# Patient Record
Sex: FEMALE | Race: WHITE | URBAN - METROPOLITAN AREA
[De-identification: names, ages, dates, MRNs, and addresses within clinical notes are randomized per-mention and may not be internally consistent; named-entity substitution may affect disease eponyms.]

---

## 2017-01-24 ENCOUNTER — OFFICE VISIT (OUTPATIENT)
Dept: PEDIATRICS | Facility: CLINIC | Age: 37
End: 2017-01-24
Payer: COMMERCIAL

## 2017-01-24 VITALS
WEIGHT: 172.9 LBS | DIASTOLIC BLOOD PRESSURE: 66 MMHG | OXYGEN SATURATION: 97 % | TEMPERATURE: 98.6 F | HEART RATE: 89 BPM | SYSTOLIC BLOOD PRESSURE: 110 MMHG | BODY MASS INDEX: 28.81 KG/M2 | HEIGHT: 65 IN

## 2017-01-24 DIAGNOSIS — J02.0 ACUTE STREPTOCOCCAL PHARYNGITIS: Primary | ICD-10-CM

## 2017-01-24 LAB
DEPRECATED S PYO AG THROAT QL EIA: ABNORMAL
MICRO REPORT STATUS: ABNORMAL
SPECIMEN SOURCE: ABNORMAL

## 2017-01-24 PROCEDURE — 87880 STREP A ASSAY W/OPTIC: CPT | Performed by: PHYSICIAN ASSISTANT

## 2017-01-24 PROCEDURE — 99213 OFFICE O/P EST LOW 20 MIN: CPT | Performed by: PHYSICIAN ASSISTANT

## 2017-01-24 RX ORDER — AZITHROMYCIN 250 MG/1
TABLET, FILM COATED ORAL
Qty: 6 TABLET | Refills: 0 | Status: SHIPPED | OUTPATIENT
Start: 2017-01-24 | End: 2017-05-23

## 2017-01-24 RX ORDER — PRENATAL VIT/IRON FUM/FOLIC AC 27MG-0.8MG
1 TABLET ORAL DAILY
Qty: 100 TABLET | Refills: 3 | COMMUNITY
Start: 2017-01-24

## 2017-01-24 NOTE — MR AVS SNAPSHOT
After Visit Summary   1/24/2017    Tamiko Whitman    MRN: [id number]           Patient Information     Date Of Birth          1980        Visit Information        Provider Department      1/24/2017 1:30 PM Alessandra Dorantes PA-C [facility]        Today's Diagnoses     Acute streptococcal pharyngitis    -  1       Care Instructions    Rapid strep test is positive.     Continue with rest and fluids. May take analgesics for symptomatic relief.    Do not share drinks/food with others. Discard toothbrush in one week.     Return if your symptoms persist or worsen.           Follow-ups after your visit        Who to contact     If you have questions or need follow up information about today's clinic visit or your schedule please contact [facility] directly at [phone].  Normal or non-critical lab and imaging results will be communicated to you by MyChart, letter or phone within 4 business days after the clinic has received the results. If you do not hear from us within 7 days, please contact the clinic through MyChart or phone. If you have a critical or abnormal lab result, we will notify you by phone as soon as possible.  Submit refill requests through [employer] or call your pharmacy and they will forward the refill request to us. Please allow 3 business days for your refill to be completed.          Additional Information About Your Visit        MyChart Information     [employer] gives you secure access to your electronic health record. If you see a primary care provider, you can also send messages to your care team and make appointments. If you have questions, please call your primary care clinic.  If you do not have a primary care provider, please call [phone] and they will assist you.        Care EveryWhere ID     This is your Care EveryWhere ID. This could be used by other organizations to access your [place] medical records  [id number]        Your Vitals  "Were     Pulse Temperature Height BMI (Body Mass Index) Pulse Oximetry       89 98.6  F (37  C) (Oral) 5' 5\" (1.651 m) 28.77 kg/m2 97%        Blood Pressure from Last 3 Encounters:   01/24/17 110/66   03/29/16 96/53   12/30/13 115/58    Weight from Last 3 Encounters:   01/24/17 172 lb 14.4 oz (78.427 kg)   12/30/13 162 lb 3.2 oz (73.573 kg)              We Performed the Following     Strep, Rapid Screen          Today's Medication Changes          These changes are accurate as of: 1/24/17  2:04 PM.  If you have any questions, ask your nurse or doctor.               Start taking these medicines.        Dose/Directions    azithromycin 250 MG tablet   Commonly known as:  ZITHROMAX   Used for:  Acute streptococcal pharyngitis   Started by:  Alessandra Dorantes PA-C        Two tablets first day, then one tablet daily for four days.   Quantity:  6 tablet   Refills:  0            Where to get your medicines      These medications were sent to [facility] [address] [place] [id number]    Hours:  24-hours Phone:  [phone]    - azithromycin 250 MG tablet             Primary Care Provider Office Phone # Fax #    Mell Burrows PA-C [phone]       [facility] [address] [facility] [address]  [facility] [address]        Thank you!     Thank you for choosing [facility]  for your care. Our goal is always to provide you with excellent care. Hearing back from our patients is one way we can continue to improve our services. Please take a few minutes to complete the written survey that you may receive in the mail after your visit with us. Thank you!             Your Updated Medication List - Protect others around you: Learn how to safely use, store and throw away your medicines at www.disposemymeds.org.          This list is accurate as of: 1/24/17  2:04 PM.  Always use your most recent med " list.                   Brand Name Dispense Instructions for use    azithromycin 250 MG tablet    ZITHROMAX    6 tablet    Two tablets first day, then one tablet daily for four days.       IRON (FERROUS GLUCONATE) 325 MG Tabs      1 TABLET DAILY       prenatal multivitamin  plus iron 27-0.8 MG Tabs per tablet     100 tablet    Take 1 tablet by mouth daily

## 2017-01-24 NOTE — NURSING NOTE
"Chief Complaint   Patient presents with     Pharyngitis       Initial /66 mmHg  Pulse 89  Temp(Src) 98.6  F (37  C) (Oral)  Ht 5' 5\" (1.651 m)  Wt 172 lb 14.4 oz (78.427 kg)  BMI 28.77 kg/m2  SpO2 97% Estimated body mass index is 28.77 kg/(m^2) as calculated from the following:    Height as of this encounter: 5' 5\" (1.651 m).    Weight as of this encounter: 172 lb 14.4 oz (78.427 kg).  BP completed using cuff size: regular    "

## 2017-01-24 NOTE — PROGRESS NOTES
"  SUBJECTIVE:                                                    Tamiko Whitman is a 36 year old female who presents to clinic today for the following health issues:    Acute Illness   Acute illness concerns: sore throat  Onset: yesterday morning     Fever: no    Chills/Sweats: YES    Headache (location?): YES    Sinus Pressure:no    Conjunctivitis:  no    Ear Pain: YES: right    Rhinorrhea: no    Congestion: no    Sore Throat: YES-only with swallowing     Cough: no    Wheeze: no    Decreased Appetite: YES    Nausea: YES    Vomiting: no    Diarrhea:  no    Dysuria/Freq.: no    Fatigue/Achiness: YES    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     Problem list, Medication list, Allergies, and Medical/Social/Surgical histories reviewed in [facility] and updated as appropriate.    ROS:  ROS otherwise negative    OBJECTIVE:                                                    /66 mmHg  Pulse 89  Temp(Src) 98.6  F (37  C) (Oral)  Ht 5' 5\" (1.651 m)  Wt 172 lb 14.4 oz (78.427 kg)  BMI 28.77 kg/m2  SpO2 97%  Body mass index is 28.77 kg/(m^2).   GENERAL: alert, no distress  HENT: ear canals- normal; TMs- normal; Nose- normal; Mouth- erythemic posterior pharynx without exudate; no sinus tenderness   NECK: tonsillar LAD  RESP: lungs clear to auscultation - no rales, no rhonchi, no wheezes  CV: regular rates and rhythm, normal S1 S2, no S3 or S4 and no murmur, no click or rub    Diagnostic test results:  Results for orders placed or performed in visit on 01/24/17 (from the past 24 hour(s))   Strep, Rapid Screen   Result Value Ref Range    Specimen Description Throat     Rapid Strep A Screen (A)      POSITIVE: Group A Streptococcal antigen detected by immunoassay.    Micro Report Status FINAL 01/24/2017         ASSESSMENT/PLAN:                                                    (J02.0) Acute streptococcal pharyngitis  (primary encounter " diagnosis)  Comment: begin antibiotics. Limit exposures.   Plan: azithromycin (ZITHROMAX) 250 MG tablet          See Patient Instructions    Alessandra Dorantes PA-C  [facility]

## 2017-05-23 ENCOUNTER — OFFICE VISIT (OUTPATIENT)
Dept: FAMILY MEDICINE | Facility: CLINIC | Age: 37
End: 2017-05-23
Payer: COMMERCIAL

## 2017-05-23 VITALS
WEIGHT: 172.2 LBS | BODY MASS INDEX: 28.69 KG/M2 | HEART RATE: 64 BPM | TEMPERATURE: 98.6 F | SYSTOLIC BLOOD PRESSURE: 114 MMHG | OXYGEN SATURATION: 100 % | HEIGHT: 65 IN | DIASTOLIC BLOOD PRESSURE: 70 MMHG

## 2017-05-23 DIAGNOSIS — D50.9 IRON DEFICIENCY ANEMIA, UNSPECIFIED IRON DEFICIENCY ANEMIA TYPE: ICD-10-CM

## 2017-05-23 DIAGNOSIS — R21 RASH AND NONSPECIFIC SKIN ERUPTION: Primary | ICD-10-CM

## 2017-05-23 DIAGNOSIS — E53.8 VITAMIN B 12 DEFICIENCY: ICD-10-CM

## 2017-05-23 DIAGNOSIS — Z78.9 BREASTFEEDING (INFANT): ICD-10-CM

## 2017-05-23 DIAGNOSIS — O92.29 SORE NIPPLES DUE TO LACTATION: ICD-10-CM

## 2017-05-23 LAB — VIT B12 SERPL-MCNC: 503 PG/ML (ref 193–986)

## 2017-05-23 PROCEDURE — 82607 VITAMIN B-12: CPT | Performed by: PHYSICIAN ASSISTANT

## 2017-05-23 PROCEDURE — 99213 OFFICE O/P EST LOW 20 MIN: CPT | Performed by: PHYSICIAN ASSISTANT

## 2017-05-23 PROCEDURE — 36415 COLL VENOUS BLD VENIPUNCTURE: CPT | Performed by: PHYSICIAN ASSISTANT

## 2017-05-23 PROCEDURE — 82728 ASSAY OF FERRITIN: CPT | Performed by: PHYSICIAN ASSISTANT

## 2017-05-23 RX ORDER — LANOLIN ALCOHOL/MO/W.PET/CERES
1000 CREAM (GRAM) TOPICAL EVERY OTHER DAY
COMMUNITY

## 2017-05-23 NOTE — PROGRESS NOTES
"HPI: 38 yo female pt here for rash  This is a slightly pruritic rash characterized by tiny red bumps on hands and arms  Her dtr had a bad diaper rash so needed antifungal cream and shortly after that she noticed the red spots on her hands and arms and chest.  Seems worse since yesterday. Started Friday  Some of these lesions itch and some don't  She cut the grass on Friday but not typically allergic to anything  She had a cold 2-3 weeks ago and has a lingering cough (dry)  It is typical for her to have this.  She is breast feeding and nipples raw and sore and bleeding since last week.  She has tried the antifungal cream which is helping some.  Followed by gyn for paps and had a baby girl 14 months ago  [facility]  Denies any new supplements or medications.  Denies new foods, creams, soaps, lotions or detergents  She works over at [employer] and no new chemical exposures.    Past Medical History:   Diagnosis Date     Syncope 2011    iron def     Past Surgical History:   Procedure Laterality Date     APPENDECTOMY  age 13     Social History   Substance Use Topics     Smoking status: Never Smoker     Smokeless tobacco: Never Used     Alcohol use No     Current Outpatient Prescriptions   Medication Sig Dispense Refill     cyanocobalamin (VITAMIN  B-12) 1000 MCG tablet Take 1,000 mcg by mouth every other day       Prenatal Vit-Fe Fumarate-FA (PRENATAL MULTIVITAMIN  PLUS IRON) 27-0.8 MG TABS per tablet Take 1 tablet by mouth daily 100 tablet 3     IRON (FERROUS GLUCONATE) 325 MG OR TABS 1 TABLET DAILY       Allergies   Allergen Reactions     Penicillins      FAMILY HISTORY NOTED AND REVIEWED    PHYSICAL EXAM:    /70 (BP Location: Right arm, Patient Position: Chair, Cuff Size: Adult Regular)  Pulse 64  Temp 98.6  F (37  C) (Oral)  Ht 5' 5\" (1.651 m)  Wt 172 lb 3.2 oz (78.1 kg)  LMP 04/17/2017 (Approximate)  SpO2 100%  BMI 28.66 kg/m2    Patient appears non toxic  Skin; there are tiny papules noted on hands and " arms and chest only  None on face  No vesicular lesions  No scale or crust  No streaking erythema.  Nipples: no rash or crust    Assessment and Plan:     (R21) Rash and nonspecific skin eruption  (primary encounter diagnosis)  Comment: ? Viral examthem from recent URI  Plan: observe for now as not particularly bothersome but she works at [employer] and wanted to make sure she didn't have some rash that was contagious    (Z78.9) Breastfeeding (infant)  Comment:   Plan: has 14 mo old dtr and she continues to breast feed regularly    (D50.9) Iron deficiency anemia, unspecified iron deficiency anemia type  Comment: hx of low iron and pt would like her ferritin rechecked.  She hasn't had normal periods since dtr born  Plan: Ferritin            (E53.8) Vitamin B 12 deficiency  Comment: does eat meat so wants level checked. Does take a supplement QOD  Plan: Vitamin B12            (O92.29) Sore nipples due to lactation  Comment:   Plan: try OTC nipple barrier cream      Mell Burrows PA-C

## 2017-05-23 NOTE — MR AVS SNAPSHOT
After Visit Summary   5/23/2017    Tamiko Whitman    MRN: [id number]           Patient Information     Date Of Birth          1980        Visit Information        Provider Department      5/23/2017 12:00 PM Mell Burrows PA-C [facility]        Today's Diagnoses     Rash and nonspecific skin eruption    -  1    Breastfeeding (infant)        Iron deficiency anemia, unspecified iron deficiency anemia type        Vitamin B 12 deficiency        Sore nipples due to lactation           Follow-ups after your visit        Who to contact     If you have questions or need follow up information about today's clinic visit or your schedule please contact [facility] directly at [phone].  Normal or non-critical lab and imaging results will be communicated to you by [employer], letter or phone within 4 business days after the clinic has received the results. If you do not hear from us within 7 days, please contact the clinic through [employer] or phone. If you have a critical or abnormal lab result, we will notify you by phone as soon as possible.  Submit refill requests through [employer] or call your pharmacy and they will forward the refill request to us. Please allow 3 business days for your refill to be completed.          Additional Information About Your Visit        MyChart Information     [employer] gives you secure access to your electronic health record. If you see a primary care provider, you can also send messages to your care team and make appointments. If you have questions, please call your primary care clinic.  If you do not have a primary care provider, please call [phone] and they will assist you.        Care EveryWhere ID     This is your Care EveryWhere ID. This could be used by other organizations to access your [place] medical records  [id number]        Your Vitals Were     Pulse Temperature Height Last Period Pulse Oximetry BMI (Body Mass Index)    64 98.6  F  "(37  C) (Oral) 5' 5\" (1.651 m) 04/17/2017 (Approximate) 100% 28.66 kg/m2       Blood Pressure from Last 3 Encounters:   05/23/17 114/70   01/24/17 110/66   03/29/16 96/53    Weight from Last 3 Encounters:   05/23/17 172 lb 3.2 oz (78.1 kg)   01/24/17 172 lb 14.4 oz (78.4 kg)   12/30/13 162 lb 3.2 oz (73.6 kg)              We Performed the Following     Ferritin     Vitamin B12        Primary Care Provider Office Phone # Fax #    Mell Burrows PA-C [phone]       [facility] [address] [address]        Thank you!     Thank you for choosing [facility]  for your care. Our goal is always to provide you with excellent care. Hearing back from our patients is one way we can continue to improve our services. Please take a few minutes to complete the written survey that you may receive in the mail after your visit with us. Thank you!             Your Updated Medication List - Protect others around you: Learn how to safely use, store and throw away your medicines at www.disposemymeds.org.          This list is accurate as of: 5/23/17  2:13 PM.  Always use your most recent med list.                   Brand Name Dispense Instructions for use    cyanocobalamin 1000 MCG tablet    vitamin  B-12     Take 1,000 mcg by mouth every other day       IRON (FERROUS GLUCONATE) 325 MG Tabs      1 TABLET DAILY       prenatal multivitamin  plus iron 27-0.8 MG Tabs per tablet     100 tablet    Take 1 tablet by mouth daily         "

## 2017-05-23 NOTE — Clinical Note
Please abstract the following data from this visit with this patient into the appropriate field in Epic:  Pap smear done on this date: 11/25/2014 (approximately), by this group: [facility], results were Normal.

## 2017-05-23 NOTE — NURSING NOTE
"Chief Complaint   Patient presents with     Establish Care       Initial /70 (BP Location: Right arm, Patient Position: Chair, Cuff Size: Adult Regular)  Pulse 64  Temp 98.6  F (37  C) (Oral)  Ht 5' 5\" (1.651 m)  Wt 172 lb 3.2 oz (78.1 kg)  LMP 04/17/2017 (Approximate)  SpO2 100%  BMI 28.66 kg/m2 Estimated body mass index is 28.66 kg/(m^2) as calculated from the following:    Height as of this encounter: 5' 5\" (1.651 m).    Weight as of this encounter: 172 lb 3.2 oz (78.1 kg).  Medication Reconciliation: complete   Naz Monte MA  "

## 2017-05-24 LAB — FERRITIN SERPL-MCNC: 44 NG/ML (ref 12–150)

## 2017-07-31 ENCOUNTER — OFFICE VISIT (OUTPATIENT)
Dept: URGENT CARE | Facility: URGENT CARE | Age: 37
End: 2017-07-31
Payer: COMMERCIAL

## 2017-07-31 VITALS
WEIGHT: 159 LBS | HEART RATE: 83 BPM | SYSTOLIC BLOOD PRESSURE: 131 MMHG | BODY MASS INDEX: 26.46 KG/M2 | DIASTOLIC BLOOD PRESSURE: 85 MMHG | TEMPERATURE: 96.8 F | OXYGEN SATURATION: 99 %

## 2017-07-31 DIAGNOSIS — S61.309A AVULSION OF FINGERNAIL OF LEFT HAND: Primary | ICD-10-CM

## 2017-07-31 DIAGNOSIS — S61.319A: ICD-10-CM

## 2017-07-31 PROCEDURE — 99213 OFFICE O/P EST LOW 20 MIN: CPT | Performed by: INTERNAL MEDICINE

## 2017-07-31 NOTE — PROGRESS NOTES
SUBJECTIVE:  Tamiko Whitman, a 37 year old female scheduled an appointment to discuss the following issues:  Chief Complaint   Patient presents with     Laceration     Pt in clinic to have eval for left hand index finger laceration.       Patient was cutting vegetables for dinner and cut off the tip of her left finger and fingernail.  She came directly from home.    Tdap 2016        Current Outpatient Prescriptions on File Prior to Visit:  cyanocobalamin (VITAMIN  B-12) 1000 MCG tablet Take 1,000 mcg by mouth every other day   Prenatal Vit-Fe Fumarate-FA (PRENATAL MULTIVITAMIN  PLUS IRON) 27-0.8 MG TABS per tablet Take 1 tablet by mouth daily   IRON (FERROUS GLUCONATE) 325 MG OR TABS 1 TABLET DAILY     No current facility-administered medications on file prior to visit.       Medical, social, surgical, and family histories reviewed and updated as of 7/31/2017.    OBJECTIVE:  /85  Pulse 83  Temp 96.8  F (36  C) (Tympanic)  Wt 159 lb (72.1 kg)  SpO2 99%  BMI 26.46 kg/m2  EXAM:  GENERAL APPEARANCE: healthy, alert and  distress due to pain  MS: Left hand/index finger  Distal lateral tip of nail-skin was cut off  No skin flap or area to suture  Actively bleeding during exam.  Pressure applied to control bleeding with 3 separate layers of Gelfoam needing to be applied with pressure to stop bleeding  Area bandaged and   Pressure Tubegauz applied by nurse      ASSESSMENT/PLAN:    ASSESSMENT/PLAN:      ICD-10-CM    1. Avulsion of fingernail of left hand S61.309A    2. Laceration of fingernail bed, initial encounter S61.319A        Instructed patient to leave to gauze in place until fell off on its own at which point she can apply an antibiotic ointment to it twice a day.    Recommend elevating finger and no heavy activity to avoid rebleeding over next few days    Call or return to clinic if symptoms worsen or fail to improve as anticipated.         Candelaria Nuñez MD

## 2017-07-31 NOTE — NURSING NOTE
"Chief Complaint   Patient presents with     Laceration     Pt in clinic to have eval for left hand index finger laceration.       Initial /85  Pulse 83  Temp 96.8  F (36  C) (Tympanic)  Wt 159 lb (72.1 kg)  SpO2 99%  BMI 26.46 kg/m2 Estimated body mass index is 26.46 kg/(m^2) as calculated from the following:    Height as of 5/23/17: 5' 5\" (1.651 m).    Weight as of this encounter: 159 lb (72.1 kg).  Medication Reconciliation: complete   Yvrose Greenwood/ MA    "

## 2017-07-31 NOTE — MR AVS SNAPSHOT
After Visit Summary   7/31/2017    Tamiko Whitman    MRN: [id number]           Patient Information     Date Of Birth          1980        Visit Information        Provider Department      7/31/2017 6:25 PM Candelaria Nuñez MD [facility]        Today's Diagnoses     Avulsion of fingernail of left hand    -  1    Laceration of fingernail bed, initial encounter           Follow-ups after your visit        Who to contact     If you have questions or need follow up information about today's clinic visit or your schedule please contact [facility] directly at [phone].  Normal or non-critical lab and imaging results will be communicated to you by MyChart, letter or phone within 4 business days after the clinic has received the results. If you do not hear from us within 7 days, please contact the clinic through [employer] or phone. If you have a critical or abnormal lab result, we will notify you by phone as soon as possible.  Submit refill requests through [employer] or call your pharmacy and they will forward the refill request to us. Please allow 3 business days for your refill to be completed.          Additional Information About Your Visit        MyChart Information     [employer] gives you secure access to your electronic health record. If you see a primary care provider, you can also send messages to your care team and make appointments. If you have questions, please call your primary care clinic.  If you do not have a primary care provider, please call [phone] and they will assist you.        Care EveryWhere ID     This is your Care EveryWhere ID. This could be used by other organizations to access your [place] medical records  [id number]        Your Vitals Were     Pulse Temperature Pulse Oximetry BMI (Body Mass Index)          83 96.8  F (36  C) (Tympanic) 99% 26.46 kg/m2         Blood Pressure from Last 3 Encounters:   07/31/17 131/85    05/23/17 114/70   01/24/17 110/66    Weight from Last 3 Encounters:   07/31/17 159 lb (72.1 kg)   05/23/17 172 lb 3.2 oz (78.1 kg)   01/24/17 172 lb 14.4 oz (78.4 kg)              Today, you had the following     No orders found for display       Primary Care Provider Office Phone # Fax #    Mell Burrows PA-C [phone]       [facility] [address]  [facility] [address]        Equal Access to Services     [facility] ELIEL : Hadii aad ku hadasho Soomaali, waaxda luqadaha, qaybta kaalmada adeegyada, fam wharton haydarrick mallory . So [facility] [phone].    ATENCIÓN: Si habla español, tiene a mon disposición servicios gratuitos de asistencia lingüística. Llame al [phone].    We comply with applicable federal civil rights laws and [place] laws. We do not discriminate on the basis of race, color, national origin, age, disability sex, sexual orientation or gender identity.            Thank you!     Thank you for choosing [facility]  for your care. Our goal is always to provide you with excellent care. Hearing back from our patients is one way we can continue to improve our services. Please take a few minutes to complete the written survey that you may receive in the mail after your visit with us. Thank you!             Your Updated Medication List - Protect others around you: Learn how to safely use, store and throw away your medicines at www.disposemymeds.org.          This list is accurate as of: 7/31/17  7:36 PM.  Always use your most recent med list.                   Brand Name Dispense Instructions for use Diagnosis    cyanocobalamin 1000 MCG tablet    vitamin  B-12     Take 1,000 mcg by mouth every other day        FOLIC ACID PO      Take 1 mg by mouth daily        IRON (FERROUS GLUCONATE) 325 MG Tabs      1 TABLET DAILY        prenatal multivitamin  plus iron 27-0.8 MG Tabs per tablet     100 tablet    Take 1 tablet by mouth daily

## 2017-08-08 ENCOUNTER — NURSE TRIAGE (OUTPATIENT)
Dept: NURSING | Facility: CLINIC | Age: 37
End: 2017-08-08

## 2017-08-08 NOTE — TELEPHONE ENCOUNTER
Patient requesting what to do with left index finger dressing that was placed on 7/31/17 at .  FNA referenced notes in EPIC and reviewed the instructions with patient.  Patient states she has not removed any of the dressing that was placed at .  FNA instructed to remove while speaking with patient and she stated she is at work and will do so when she gets home.  FNA advised that once dressing is removed to thoroughly and gently wash area and may apply antibiotic ointment.  Encouraged patient to call back if any questions/concerns once she removed dressing.

## 2017-11-03 LAB
ABO + RH BLD: NORMAL
ABO + RH BLD: NORMAL
BLD GP AB SCN SERPL QL: NORMAL
HBV SURFACE AG SERPL QL IA: NORMAL
HIV 1+2 AB+HIV1 P24 AG SERPL QL IA: NORMAL
RUBELLA ANTIBODY IGG QUANTITATIVE: NORMAL IU/ML
T PALLIDUM IGG SER QL: NON REACTIVE

## 2017-12-21 ENCOUNTER — OFFICE VISIT (OUTPATIENT)
Dept: FAMILY MEDICINE | Facility: CLINIC | Age: 37
End: 2017-12-21
Payer: COMMERCIAL

## 2017-12-21 VITALS
OXYGEN SATURATION: 98 % | RESPIRATION RATE: 16 BRPM | BODY MASS INDEX: 26.66 KG/M2 | DIASTOLIC BLOOD PRESSURE: 58 MMHG | SYSTOLIC BLOOD PRESSURE: 106 MMHG | TEMPERATURE: 98 F | WEIGHT: 160 LBS | HEART RATE: 70 BPM | HEIGHT: 65 IN

## 2017-12-21 DIAGNOSIS — J01.90 ACUTE SINUSITIS WITH SYMPTOMS > 10 DAYS: Primary | ICD-10-CM

## 2017-12-21 DIAGNOSIS — Z33.1 PREGNANCY, INCIDENTAL: ICD-10-CM

## 2017-12-21 PROCEDURE — 99213 OFFICE O/P EST LOW 20 MIN: CPT | Performed by: PHYSICIAN ASSISTANT

## 2017-12-21 RX ORDER — AZITHROMYCIN 250 MG/1
TABLET, FILM COATED ORAL
Qty: 6 TABLET | Refills: 0 | Status: SHIPPED | OUTPATIENT
Start: 2017-12-21 | End: 2018-02-22

## 2017-12-21 NOTE — NURSING NOTE
"Chief Complaint   Patient presents with     Sinus Problem       Initial /58  Pulse 70  Temp 98  F (36.7  C) (Tympanic)  Resp 16  Ht 5' 5\" (1.651 m)  Wt 160 lb (72.6 kg)  LMP 04/17/2017 (Approximate)  SpO2 98%  Breastfeeding? No  BMI 26.63 kg/m2 Estimated body mass index is 26.63 kg/(m^2) as calculated from the following:    Height as of this encounter: 5' 5\" (1.651 m).    Weight as of this encounter: 160 lb (72.6 kg).  Medication Reconciliation: complete  "

## 2017-12-21 NOTE — PROGRESS NOTES
SUBJECTIVE:   Tamiko Whitman is a 37 year old female who presents to clinic today for the following health issues:    Pt here with feeling sick since Oct  She has persistent nasal drainage off and on for the past 2 months  Has post nasal drip and worse in the morning  She had laryngitis initially but that improved  She had pink eye in Nov and did see eye doc and used drops which helped resolve that  Has had HA for a few days  Denies any fever or chills  Does not have a hx of sinus infections  Has a 20 month old dtr who is constantly sick and she is currently on antibiotics for ear infections.  Denies any cough  She is currently 17 weeks pregnant and still breast feeding at night.        RESPIRATORY SYMPTOMS      Duration: almost 2 months    Description  nasal congestion, rhinorrhea, sore throat, headache, fatigue/malaise and hoarse voice. Pinkeye in November    Severity: mild    Accompanying signs and symptoms: None    History (predisposing factors):  none    Precipitating or alleviating factors: None    Therapies tried and outcome:  none       Past Medical History:   Diagnosis Date     Syncope 2011    iron def     Past Surgical History:   Procedure Laterality Date     APPENDECTOMY  age 13     Social History   Substance Use Topics     Smoking status: Never Smoker     Smokeless tobacco: Never Used     Alcohol use No     Current Outpatient Prescriptions   Medication Sig Dispense Refill     azithromycin (ZITHROMAX) 250 MG tablet Two tablets first day, then one tablet daily for four days. 6 tablet 0     FOLIC ACID PO Take 1 mg by mouth daily       cyanocobalamin (VITAMIN  B-12) 1000 MCG tablet Take 1,000 mcg by mouth every other day       Prenatal Vit-Fe Fumarate-FA (PRENATAL MULTIVITAMIN  PLUS IRON) 27-0.8 MG TABS per tablet Take 1 tablet by mouth daily 100 tablet 3     IRON (FERROUS GLUCONATE) 325 MG OR TABS 1 TABLET DAILY       Allergies   Allergen Reactions     Amoxicillin      Penicillins      FAMILY HISTORY  "NOTED AND REVIEWED    PHYSICAL EXAM:    /58  Pulse 70  Temp 98  F (36.7  C) (Tympanic)  Resp 16  Ht 5' 5\" (1.651 m)  Wt 160 lb (72.6 kg)  LMP 04/17/2017 (Approximate)  SpO2 98%  Breastfeeding? No  BMI 26.63 kg/m2    Patient appears non toxic  Ears: TM pearly grey  Throat; mildly erythematous without exudates  Nose: no erythema or edema  No maxillary or frontal sinus tenderness.  Lungs: CTA bilat  Heart: RRR    Assessment and Plan:     (J01.90) Acute sinusitis with symptoms > 10 days  (primary encounter diagnosis)  Comment: allergic to pcn  Plan: azithromycin (ZITHROMAX) 250 MG tablet        TAD, recd nasal saline spray, tylenol as needed    (Z33.1) Pregnancy, incidental  Comment:   Plan: she is 17 weeks pregnant.      Mell Burrows PA-C        "

## 2017-12-21 NOTE — MR AVS SNAPSHOT
"              After Visit Summary   12/21/2017    Tamiko Whitman    MRN: [id number]           Patient Information     Date Of Birth          1980        Visit Information        Provider Department      12/21/2017 12:00 PM Mell Burrows PA-C [facility]        Today's Diagnoses     Acute sinusitis with symptoms > 10 days    -  1    Pregnancy, incidental           Follow-ups after your visit        Who to contact     If you have questions or need follow up information about today's clinic visit or your schedule please contact [facility] directly at [phone].  Normal or non-critical lab and imaging results will be communicated to you by [employer], letter or phone within 4 business days after the clinic has received the results. If you do not hear from us within 7 days, please contact the clinic through [employer] or phone. If you have a critical or abnormal lab result, we will notify you by phone as soon as possible.  Submit refill requests through [employer] or call your pharmacy and they will forward the refill request to us. Please allow 3 business days for your refill to be completed.          Additional Information About Your Visit        MyChart Information     [employer] gives you secure access to your electronic health record. If you see a primary care provider, you can also send messages to your care team and make appointments. If you have questions, please call your primary care clinic.  If you do not have a primary care provider, please call [phone] and they will assist you.        Care EveryWhere ID     This is your Care EveryWhere ID. This could be used by other organizations to access your [facility] medical records  [id number]        Your Vitals Were     Pulse Temperature Respirations Height Last Period Pulse Oximetry    70 98  F (36.7  C) (Tympanic) 16 5' 5\" (1.651 m) 04/17/2017 (Approximate) 98%    Breastfeeding? BMI (Body Mass Index)                No 26.63 kg/m2     "       Blood Pressure from Last 3 Encounters:   12/21/17 106/58   07/31/17 131/85   05/23/17 114/70    Weight from Last 3 Encounters:   12/21/17 160 lb (72.6 kg)   07/31/17 159 lb (72.1 kg)   05/23/17 172 lb 3.2 oz (78.1 kg)              Today, you had the following     No orders found for display         Today's Medication Changes          These changes are accurate as of: 12/21/17 12:35 PM.  If you have any questions, ask your nurse or doctor.               Start taking these medicines.        Dose/Directions    azithromycin 250 MG tablet   Commonly known as:  ZITHROMAX   Used for:  Acute sinusitis with symptoms > 10 days   Started by:  Mell Burrows PA-C        Two tablets first day, then one tablet daily for four days.   Quantity:  6 tablet   Refills:  0            Where to get your medicines      These medications were sent to [place], [place] - [address], [address]  [address], [facility] [address], [facility] [address]     Phone:  [phone]     azithromycin 250 MG tablet                Primary Care Provider Office Phone # Fax #    Mell Burrows PA-C [phone] [address]  [facility] [address]        Equal Access to Services     PRIYANKA JULIAN : Hadii aad ku hadasho Soomaali, waaxda luqadaha, qaybta kaalmada adeegyada, waxjoshua escalantein hayaan gera mallory . So [facility] [phone].    ATENCIÓN: Si habla español, tiene a mon disposición servicios gratuitos de asistencia lingüística. Llame al [phone].    We comply with applicable federal civil rights laws and [place] laws. We do not discriminate on the basis of race, color, national origin, age, disability, sex, sexual orientation, or gender identity.            Thank you!     Thank you for choosing [facility]  for your care. Our goal is always to provide you with excellent care. Hearing back from our patients is one way we can continue to improve our services. Please take a few  minutes to complete the written survey that you may receive in the mail after your visit with us. Thank you!             Your Updated Medication List - Protect others around you: Learn how to safely use, store and throw away your medicines at www.disposemymeds.org.          This list is accurate as of: 12/21/17 12:35 PM.  Always use your most recent med list.                   Brand Name Dispense Instructions for use Diagnosis    azithromycin 250 MG tablet    ZITHROMAX    6 tablet    Two tablets first day, then one tablet daily for four days.    Acute sinusitis with symptoms > 10 days       cyanocobalamin 1000 MCG tablet    vitamin  B-12     Take 1,000 mcg by mouth every other day        FOLIC ACID PO      Take 1 mg by mouth daily        IRON (FERROUS GLUCONATE) 325 MG Tabs      1 TABLET DAILY        prenatal multivitamin plus iron 27-0.8 MG Tabs per tablet     100 tablet    Take 1 tablet by mouth daily

## 2018-01-28 ENCOUNTER — HEALTH MAINTENANCE LETTER (OUTPATIENT)
Age: 38
End: 2018-01-28

## 2018-02-22 ENCOUNTER — OFFICE VISIT (OUTPATIENT)
Dept: PEDIATRICS | Facility: CLINIC | Age: 38
End: 2018-02-22
Payer: COMMERCIAL

## 2018-02-22 VITALS
HEIGHT: 65 IN | DIASTOLIC BLOOD PRESSURE: 60 MMHG | OXYGEN SATURATION: 98 % | TEMPERATURE: 100.1 F | HEART RATE: 110 BPM | WEIGHT: 169.7 LBS | SYSTOLIC BLOOD PRESSURE: 94 MMHG | BODY MASS INDEX: 28.27 KG/M2

## 2018-02-22 DIAGNOSIS — J10.1 INFLUENZA A: Primary | ICD-10-CM

## 2018-02-22 LAB
DEPRECATED S PYO AG THROAT QL EIA: NORMAL
FLUAV+FLUBV AG SPEC QL: NEGATIVE
FLUAV+FLUBV AG SPEC QL: POSITIVE
SPECIMEN SOURCE: ABNORMAL
SPECIMEN SOURCE: NORMAL

## 2018-02-22 PROCEDURE — 87880 STREP A ASSAY W/OPTIC: CPT | Performed by: INTERNAL MEDICINE

## 2018-02-22 PROCEDURE — 87804 INFLUENZA ASSAY W/OPTIC: CPT | Performed by: INTERNAL MEDICINE

## 2018-02-22 PROCEDURE — 99213 OFFICE O/P EST LOW 20 MIN: CPT | Mod: GE | Performed by: STUDENT IN AN ORGANIZED HEALTH CARE EDUCATION/TRAINING PROGRAM

## 2018-02-22 PROCEDURE — 87081 CULTURE SCREEN ONLY: CPT | Performed by: INTERNAL MEDICINE

## 2018-02-22 RX ORDER — OSELTAMIVIR PHOSPHATE 75 MG/1
75 CAPSULE ORAL 2 TIMES DAILY
Qty: 10 CAPSULE | Refills: 0 | Status: ON HOLD | OUTPATIENT
Start: 2018-02-22 | End: 2018-06-07

## 2018-02-22 NOTE — MR AVS SNAPSHOT
After Visit Summary   2/22/2018    Tamiko Whitman    MRN: [id number]           Patient Information     Date Of Birth          1980        Visit Information        Provider Department      2/22/2018 9:00 AM Dolores Leyva MD [facility]        Today's Diagnoses     Acute pharyngitis, unspecified etiology    -  1    Fever        Influenza A          Care Instructions    Thank you for coming to clinic today. It was a pleasure to see you and I would be happy to see you back at any time for follow up or for new health issues.    1. Positive for Influenza A - will treat with Tamiflu twice a day for 5 days.  2. Drink lots of fluids and get rest.  3. Go to the ED if:   - Difficulty breathing or shortness of breath  - Pain or pressure in the chest or abdomen  - Sudden dizziness  - Confusion  - Severe or persistent vomiting  - High fever that is not responding to medications  - Decreased or no movement of your baby    Hope you feel better soon!    Kiana Leyva MD          Follow-ups after your visit        Who to contact     If you have questions or need follow up information about today's clinic visit or your schedule please contact [facility] directly at [phone].  Normal or non-critical lab and imaging results will be communicated to you by MyChart, letter or phone within 4 business days after the clinic has received the results. If you do not hear from us within 7 days, please contact the clinic through [email or web address] or phone. If you have a critical or abnormal lab result, we will notify you by phone as soon as possible.  Submit refill requests through [employer] or call your pharmacy and they will forward the refill request to us. Please allow 3 business days for your refill to be completed.          Additional Information About Your Visit        MyChart Information     [employer] gives you secure access to your electronic health record. If you see a primary care provider, you can  "also send messages to your care team and make appointments. If you have questions, please call your primary care clinic.  If you do not have a primary care provider, please call [phone] and they will assist you.        Care EveryWhere ID     This is your Care EveryWhere ID. This could be used by other organizations to access your [facility] medical records  [id number]        Your Vitals Were     Pulse Temperature Height Last Period Pulse Oximetry BMI (Body Mass Index)    123 100.1  F (37.8  C) (Oral) 5' 5\" (1.651 m) 04/17/2017 (Approximate) 98% 28.24 kg/m2       Blood Pressure from Last 3 Encounters:   02/22/18 94/60   12/21/17 106/58   07/31/17 131/85    Weight from Last 3 Encounters:   02/22/18 169 lb 11.2 oz (77 kg)   12/21/17 160 lb (72.6 kg)   07/31/17 159 lb (72.1 kg)              We Performed the Following     Beta strep group A culture     Influenza A/B antigen     Strep, Rapid Screen          Today's Medication Changes          These changes are accurate as of 2/22/18  9:57 AM.  If you have any questions, ask your nurse or doctor.               Start taking these medicines.        Dose/Directions    oseltamivir 75 MG capsule   Commonly known as:  TAMIFLU   Used for:  Influenza A   Started by:  Dolores Leyav MD        Dose:  75 mg   Take 1 capsule (75 mg) by mouth 2 times daily   Quantity:  10 capsule   Refills:  0            Where to get your medicines      These medications were sent to [facility] - [address]   [address]  [address], [place] [address]     Phone:  [phone]     oseltamivir 75 MG capsule                Primary Care Provider Office Phone # Fax #    Mell Burrows PA-C [phone] [address]  [place] [address]        Equal Access to Services     PRIYANKA JULIAN AH: Hadii aad ku hadasho Soomaali, waaxda luqadaha, qaybta kaalmada adeegyada, fam mallory ah. So [facility] " [phone].    ATENCIÓN: Si easton loya, tiene a mon disposición servicios gratuitos de asistencia lingüística. Lily duque [phone].    We comply with applicable federal civil rights laws and [place] laws. We do not discriminate on the basis of race, color, national origin, age, disability, sex, sexual orientation, or gender identity.            Thank you!     Thank you for choosing [facility]  for your care. Our goal is always to provide you with excellent care. Hearing back from our patients is one way we can continue to improve our services. Please take a few minutes to complete the written survey that you may receive in the mail after your visit with us. Thank you!             Your Updated Medication List - Protect others around you: Learn how to safely use, store and throw away your medicines at www.disposemymeds.org.          This list is accurate as of 2/22/18  9:57 AM.  Always use your most recent med list.                   Brand Name Dispense Instructions for use Diagnosis    cyanocobalamin 1000 MCG tablet    vitamin  B-12     Take 1,000 mcg by mouth every other day        FOLIC ACID PO      Take 1 mg by mouth daily        IRON (FERROUS GLUCONATE) 325 MG Tabs      1 TABLET DAILY        oseltamivir 75 MG capsule    TAMIFLU    10 capsule    Take 1 capsule (75 mg) by mouth 2 times daily    Influenza A       prenatal multivitamin plus iron 27-0.8 MG Tabs per tablet     100 tablet    Take 1 tablet by mouth daily

## 2018-02-22 NOTE — PROGRESS NOTES
SUBJECTIVE:   Tamiko Whitman is a 37 year old female who presents to clinic today for the following health issues:      Acute Illness   Acute illness concerns: chills and cough  26 weeks pregnant   Onset: yesterday    Fever: no    Chills/Sweats: YES- chills     Headache (location?): YES- frontal     Sinus Pressure: YES    Conjunctivitis:  no    Ear Pain: no    Rhinorrhea: YES    Congestion: YES    Sore Throat: YES mild     Cough: YES-non-productive (forceful)    Wheeze: no    Decreased Appetite: no    Nausea: no    Vomiting: no    Diarrhea:  no    Dysuria/Freq.: no    Fatigue/Achiness: YES- body aches     Sick/Strep Exposure: YES- 2yr has an fever and cough, similar symptoms. + Flu exposures at her .     Therapies Tried and outcome: Tylenol for myalgias, mild improvement    Also feels like her heart is racing. Did get flu shot 10/2017. Currently pregnant, due in May.      Problem list and histories reviewed & adjusted, as indicated.  Additional history: as documented    Patient Active Problem List   Diagnosis     CARDIOVASCULAR SCREENING; LDL GOAL LESS THAN 160     Indication for care in labor or delivery      (spontaneous vaginal delivery)     Past Surgical History:   Procedure Laterality Date     APPENDECTOMY  age 13       Social History   Substance Use Topics     Smoking status: Never Smoker     Smokeless tobacco: Never Used     Alcohol use No     Family History   Problem Relation Age of Onset     Lipids Father      HEART DISEASE Maternal Grandmother      alive age 80     HEART DISEASE Paternal Grandmother      alive age 79         Current Outpatient Prescriptions   Medication Sig Dispense Refill     oseltamivir (TAMIFLU) 75 MG capsule Take 1 capsule (75 mg) by mouth 2 times daily 10 capsule 0     FOLIC ACID PO Take 1 mg by mouth daily       cyanocobalamin (VITAMIN  B-12) 1000 MCG tablet Take 1,000 mcg by mouth every other day       Prenatal Vit-Fe Fumarate-FA (PRENATAL MULTIVITAMIN  PLUS IRON)  "27-0.8 MG TABS per tablet Take 1 tablet by mouth daily 100 tablet 3     IRON (FERROUS GLUCONATE) 325 MG OR TABS 1 TABLET DAILY       Allergies   Allergen Reactions     Amoxicillin      Penicillins      BP Readings from Last 3 Encounters:   02/22/18 94/60   12/21/17 106/58   07/31/17 131/85    Wt Readings from Last 3 Encounters:   02/22/18 169 lb 11.2 oz (77 kg)   12/21/17 160 lb (72.6 kg)   07/31/17 159 lb (72.1 kg)                    ROS:  CONSTITUTIONAL: POSITIVE for chills and fatigue  INTEGUMENTARY/SKIN: NEGATIVE for worrisome rashes, moles or lesions  EYES: NEGATIVE for vision changes or irritation  ENT/MOUTH: POSITIVE for sore throat  RESP: POSITIVE for significant cough   CV: NEGATIVE for chest pain or peripheral edema  GI: NEGATIVE for nausea, abdominal pain or change in bowel habits  : NEGATIVE for frequency, dysuria  MUSCULOSKELETAL: POSITIVE for significant myalgias  NEURO: NEGATIVE for weakness, dizziness or paresthesias  ENDOCRINE: NEGATIVE for skin/hair changes  HEME: NEGATIVE for bleeding problems  PSYCHIATRIC: NEGATIVE for changes in mood or affect    OBJECTIVE:     BP 94/60  Pulse 123  Temp 100.1  F (37.8  C) (Oral)  Ht 5' 5\" (1.651 m)  Wt 169 lb 11.2 oz (77 kg)  LMP 04/17/2017 (Approximate)  SpO2 98%  BMI 28.24 kg/m2  Body mass index is 28.24 kg/(m^2).  GENERAL: awake, alert, appears ill  EYES: Eyes grossly normal to inspection, PERRL and conjunctivae and sclerae normal  HENT: ear canals and TM's normal, nose and mouth without ulcers or lesions, no exudates or erythema  NECK: no adenopathy  RESP: lungs clear to auscultation - no rales, rhonchi or wheezes  CV: Tachycardic, regular rhythm, no murmur, no peripheral edema and peripheral pulses strong  ABDOMEN: soft, gravid, bowel sounds normal  MS: no gross musculoskeletal defects noted, no edema. Mild muscle tenderness to palpation of thighs.  SKIN: no rashes, warm and dry  NEURO: Normal strength and tone, mentation intact and speech " normal  PSYCH: mentation appears normal, affect normal    Diagnostic Test Results:  Results for orders placed or performed in visit on 02/22/18 (from the past 24 hour(s))   Strep, Rapid Screen   Result Value Ref Range    Specimen Description Throat     Rapid Strep A Screen       NEGATIVE: No Group A streptococcal antigen detected by immunoassay, await culture report.   Influenza A/B antigen   Result Value Ref Range    Influenza A/B Agn Specimen Nasal     Influenza A Positive (A) NEG^Negative    Influenza B Negative NEG^Negative       ASSESSMENT/PLAN:     1. Influenza A  Symptoms since yesterday, <48 hours. Was concerned for strep, had similar symptoms several years ago, strep test negative. Influenza A positive. Was tachycardic to 123 in clinic, 110 on recheck, due to her infection. Normal O2 sats on RA. High risk patient due to pregnancy. Given return precautions to go to the ED or back to clinic if fevers, worsening symptoms, and watch for secondary complications as well as complications to fetus.   - Strep, Rapid Screen - negative  - Influenza A/B antigen - positive for Flu A  - Beta strep group A culture  - oseltamivir (TAMIFLU) 75 MG capsule; Take 1 capsule (75 mg) by mouth 2 times daily  Dispense: 10 capsule; Refill: 0    FUTURE APPOINTMENTS:       - Follow-up for annual visit or as needed    Patient was seen and discussed with attending, Dr. Kannan Kearns, who agrees with the above assessment and plan.    Kiana Leyva MD  PGY - 2   Internal Medicine/Pediatrics  Pager [phone]    ===========  STAFF NOTE:  Patient discussed with resident physician today.  We discussed malloy portions of the visit and I participated in the evaluation and management of the patient today.     Bill Kearns MD

## 2018-02-22 NOTE — PATIENT INSTRUCTIONS
Thank you for coming to clinic today. It was a pleasure to see you and I would be happy to see you back at any time for follow up or for new health issues.    1. Positive for Influenza A - will treat with Tamiflu twice a day for 5 days.  2. Drink lots of fluids and get rest.  3. Go to the ED if:   - Difficulty breathing or shortness of breath  - Pain or pressure in the chest or abdomen  - Sudden dizziness  - Confusion  - Severe or persistent vomiting  - High fever that is not responding to medications  - Decreased or no movement of your baby    Hope you feel better soon!    Kiana Leyva MD

## 2018-02-23 LAB
BACTERIA SPEC CULT: NORMAL
SPECIMEN SOURCE: NORMAL

## 2018-05-03 LAB — GROUP B STREP PCR: NORMAL

## 2018-06-07 ENCOUNTER — HOSPITAL ENCOUNTER (INPATIENT)
Facility: CLINIC | Age: 38
LOS: 2 days | Discharge: HOME | End: 2018-06-09
Attending: ADVANCED PRACTICE MIDWIFE | Admitting: ADVANCED PRACTICE MIDWIFE
Payer: COMMERCIAL

## 2018-06-07 LAB
ABO + RH BLD: NORMAL
ABO + RH BLD: NORMAL
ERYTHROCYTE [DISTWIDTH] IN BLOOD BY AUTOMATED COUNT: 12.9 % (ref 10–15)
HCT VFR BLD AUTO: 39.7 % (ref 35–47)
HGB BLD-MCNC: 13.7 G/DL (ref 11.7–15.7)
MCH RBC QN AUTO: 31.4 PG (ref 26.5–33)
MCHC RBC AUTO-ENTMCNC: 34.5 G/DL (ref 31.5–36.5)
MCV RBC AUTO: 91 FL (ref 78–100)
PLATELET # BLD AUTO: 236 10E9/L (ref 150–450)
RBC # BLD AUTO: 4.36 10E12/L (ref 3.8–5.2)
SPECIMEN EXP DATE BLD: NORMAL
WBC # BLD AUTO: 8 10E9/L (ref 4–11)

## 2018-06-07 PROCEDURE — 85027 COMPLETE CBC AUTOMATED: CPT | Performed by: ADVANCED PRACTICE MIDWIFE

## 2018-06-07 PROCEDURE — 72200001 ZZH LABOR CARE VAGINAL DELIVERY SINGLE

## 2018-06-07 PROCEDURE — 12000029 ZZH R&B OB INTERMEDIATE

## 2018-06-07 PROCEDURE — 36415 COLL VENOUS BLD VENIPUNCTURE: CPT | Performed by: ADVANCED PRACTICE MIDWIFE

## 2018-06-07 PROCEDURE — 0KQM0ZZ REPAIR PERINEUM MUSCLE, OPEN APPROACH: ICD-10-PCS | Performed by: ADVANCED PRACTICE MIDWIFE

## 2018-06-07 PROCEDURE — 86780 TREPONEMA PALLIDUM: CPT | Performed by: ADVANCED PRACTICE MIDWIFE

## 2018-06-07 PROCEDURE — 86901 BLOOD TYPING SEROLOGIC RH(D): CPT | Performed by: ADVANCED PRACTICE MIDWIFE

## 2018-06-07 PROCEDURE — 86900 BLOOD TYPING SEROLOGIC ABO: CPT | Performed by: ADVANCED PRACTICE MIDWIFE

## 2018-06-07 PROCEDURE — 25000125 ZZHC RX 250: Performed by: ADVANCED PRACTICE MIDWIFE

## 2018-06-07 PROCEDURE — 59025 FETAL NON-STRESS TEST: CPT

## 2018-06-07 PROCEDURE — 25000128 H RX IP 250 OP 636: Performed by: ADVANCED PRACTICE MIDWIFE

## 2018-06-07 PROCEDURE — G0463 HOSPITAL OUTPT CLINIC VISIT: HCPCS | Mod: 25

## 2018-06-07 RX ORDER — NALOXONE HYDROCHLORIDE 0.4 MG/ML
.1-.4 INJECTION, SOLUTION INTRAMUSCULAR; INTRAVENOUS; SUBCUTANEOUS
Status: DISCONTINUED | OUTPATIENT
Start: 2018-06-07 | End: 2018-06-09 | Stop reason: HOSPADM

## 2018-06-07 RX ORDER — FENTANYL CITRATE 50 UG/ML
50-100 INJECTION, SOLUTION INTRAMUSCULAR; INTRAVENOUS
Status: DISCONTINUED | OUTPATIENT
Start: 2018-06-07 | End: 2018-06-07

## 2018-06-07 RX ORDER — METHYLERGONOVINE MALEATE 0.2 MG/ML
200 INJECTION INTRAVENOUS
Status: DISCONTINUED | OUTPATIENT
Start: 2018-06-07 | End: 2018-06-07

## 2018-06-07 RX ORDER — AMOXICILLIN 250 MG
1 CAPSULE ORAL 2 TIMES DAILY
Status: DISCONTINUED | OUTPATIENT
Start: 2018-06-07 | End: 2018-06-09 | Stop reason: HOSPADM

## 2018-06-07 RX ORDER — ONDANSETRON 2 MG/ML
4 INJECTION INTRAMUSCULAR; INTRAVENOUS EVERY 6 HOURS PRN
Status: DISCONTINUED | OUTPATIENT
Start: 2018-06-07 | End: 2018-06-07

## 2018-06-07 RX ORDER — NALOXONE HYDROCHLORIDE 0.4 MG/ML
.1-.4 INJECTION, SOLUTION INTRAMUSCULAR; INTRAVENOUS; SUBCUTANEOUS
Status: DISCONTINUED | OUTPATIENT
Start: 2018-06-07 | End: 2018-06-07

## 2018-06-07 RX ORDER — CARBOPROST TROMETHAMINE 250 UG/ML
250 INJECTION, SOLUTION INTRAMUSCULAR
Status: DISCONTINUED | OUTPATIENT
Start: 2018-06-07 | End: 2018-06-07

## 2018-06-07 RX ORDER — IBUPROFEN 400 MG/1
800 TABLET, FILM COATED ORAL
Status: DISCONTINUED | OUTPATIENT
Start: 2018-06-07 | End: 2018-06-07

## 2018-06-07 RX ORDER — LANOLIN 100 %
OINTMENT (GRAM) TOPICAL
Status: DISCONTINUED | OUTPATIENT
Start: 2018-06-07 | End: 2018-06-09 | Stop reason: HOSPADM

## 2018-06-07 RX ORDER — HYDROCORTISONE 2.5 %
CREAM (GRAM) TOPICAL 3 TIMES DAILY PRN
Status: DISCONTINUED | OUTPATIENT
Start: 2018-06-07 | End: 2018-06-09 | Stop reason: HOSPADM

## 2018-06-07 RX ORDER — OXYTOCIN/0.9 % SODIUM CHLORIDE 30/500 ML
340 PLASTIC BAG, INJECTION (ML) INTRAVENOUS CONTINUOUS PRN
Status: DISCONTINUED | OUTPATIENT
Start: 2018-06-07 | End: 2018-06-09 | Stop reason: HOSPADM

## 2018-06-07 RX ORDER — OXYTOCIN 10 [USP'U]/ML
10 INJECTION, SOLUTION INTRAMUSCULAR; INTRAVENOUS
Status: DISCONTINUED | OUTPATIENT
Start: 2018-06-07 | End: 2018-06-07

## 2018-06-07 RX ORDER — OXYCODONE AND ACETAMINOPHEN 5; 325 MG/1; MG/1
1 TABLET ORAL
Status: DISCONTINUED | OUTPATIENT
Start: 2018-06-07 | End: 2018-06-07

## 2018-06-07 RX ORDER — OXYTOCIN/0.9 % SODIUM CHLORIDE 30/500 ML
100-340 PLASTIC BAG, INJECTION (ML) INTRAVENOUS CONTINUOUS PRN
Status: COMPLETED | OUTPATIENT
Start: 2018-06-07 | End: 2018-06-07

## 2018-06-07 RX ORDER — ACETAMINOPHEN 325 MG/1
650 TABLET ORAL EVERY 4 HOURS PRN
Status: DISCONTINUED | OUTPATIENT
Start: 2018-06-07 | End: 2018-06-09 | Stop reason: HOSPADM

## 2018-06-07 RX ORDER — IBUPROFEN 400 MG/1
800 TABLET, FILM COATED ORAL EVERY 6 HOURS PRN
Status: DISCONTINUED | OUTPATIENT
Start: 2018-06-07 | End: 2018-06-09 | Stop reason: HOSPADM

## 2018-06-07 RX ORDER — SODIUM CHLORIDE, SODIUM LACTATE, POTASSIUM CHLORIDE, CALCIUM CHLORIDE 600; 310; 30; 20 MG/100ML; MG/100ML; MG/100ML; MG/100ML
INJECTION, SOLUTION INTRAVENOUS CONTINUOUS
Status: DISCONTINUED | OUTPATIENT
Start: 2018-06-07 | End: 2018-06-07

## 2018-06-07 RX ORDER — BISACODYL 10 MG
10 SUPPOSITORY, RECTAL RECTAL DAILY PRN
Status: DISCONTINUED | OUTPATIENT
Start: 2018-06-09 | End: 2018-06-09 | Stop reason: HOSPADM

## 2018-06-07 RX ORDER — OXYTOCIN 10 [USP'U]/ML
10 INJECTION, SOLUTION INTRAMUSCULAR; INTRAVENOUS
Status: DISCONTINUED | OUTPATIENT
Start: 2018-06-07 | End: 2018-06-09 | Stop reason: HOSPADM

## 2018-06-07 RX ORDER — ACETAMINOPHEN 325 MG/1
650 TABLET ORAL EVERY 4 HOURS PRN
Status: DISCONTINUED | OUTPATIENT
Start: 2018-06-07 | End: 2018-06-07

## 2018-06-07 RX ORDER — AMOXICILLIN 250 MG
2 CAPSULE ORAL 2 TIMES DAILY
Status: DISCONTINUED | OUTPATIENT
Start: 2018-06-07 | End: 2018-06-09 | Stop reason: HOSPADM

## 2018-06-07 RX ORDER — OXYTOCIN/0.9 % SODIUM CHLORIDE 30/500 ML
100 PLASTIC BAG, INJECTION (ML) INTRAVENOUS CONTINUOUS
Status: DISCONTINUED | OUTPATIENT
Start: 2018-06-07 | End: 2018-06-09 | Stop reason: HOSPADM

## 2018-06-07 RX ORDER — MISOPROSTOL 200 UG/1
400 TABLET ORAL
Status: DISCONTINUED | OUTPATIENT
Start: 2018-06-07 | End: 2018-06-09 | Stop reason: HOSPADM

## 2018-06-07 RX ADMIN — SODIUM CHLORIDE, POTASSIUM CHLORIDE, SODIUM LACTATE AND CALCIUM CHLORIDE 1000 ML: 600; 310; 30; 20 INJECTION, SOLUTION INTRAVENOUS at 22:35

## 2018-06-07 RX ADMIN — FENTANYL CITRATE 100 MCG: 50 INJECTION, SOLUTION INTRAMUSCULAR; INTRAVENOUS at 20:56

## 2018-06-07 RX ADMIN — OXYTOCIN 340 ML/HR: 10 INJECTION, SOLUTION INTRAMUSCULAR; INTRAVENOUS at 20:35

## 2018-06-07 RX ADMIN — LIDOCAINE HYDROCHLORIDE 20 ML: 10 INJECTION, SOLUTION INFILTRATION; PERINEURAL at 20:48

## 2018-06-07 NOTE — PLAN OF CARE
Patient arrives to the [facility] due to labor.  The patient states she has been dian since 6 am.  This morning it was every 15 minutes and then they became closer together this afternoon and the patient states she had more of a bloody show around 1400.  The patient does not think her water broke.  The patient denies any complications with her pregnancy.  The patient wants to go as naturally as she can and she wants a tub to labor in. LOPEZ Castro arrives at the bedside at 1733.  The patient is admitted to room 213 and report is given to Mary Squires RN.

## 2018-06-07 NOTE — IP AVS SNAPSHOT
MRN:[id number]                      After Visit Summary   6/7/2018    Tamiko Whitman    MRN: [id number]           Thank you!     Thank you for choosing [facility] for your care. Our goal is always to provide you with excellent care. Hearing back from our patients is one way we can continue to improve our services. Please take a few minutes to complete the written survey that you may receive in the mail after you visit with us. Thank you!        Patient Information     Date Of Birth          1980        Designated Caregiver       Most Recent Value    Caregiver    Will someone help with your care after discharge? yes    Name of designated caregiver Zeb    Phone number of caregiver [phone]      About your hospital stay     You were admitted on:  June 7, 2018 You last received care in the:  [address]    You were discharged on:  June 9, 2018        Reason for your hospital stay       Maternity care                  Who to Call     For medical emergencies, please call 911.  For non-urgent questions about your medical care, please call your primary care provider or clinic, [phone]          Attending Provider     Provider Radha Cross APRN [facility]       Primary Care Provider Office Phone # Fax #    Mell Burrows PA-C [phone]      After Care Instructions     Activity       Review discharge instructions            Diet       Resume previous diet            Discharge Instructions - Postpartum visit       Schedule postpartum visit with your provider and return to clinic in 6 weeks.                  Follow-up Appointments     Follow Up and recommended labs and tests                 Further instructions from your care team       Postpartum Vaginal Delivery Instructions    Activity       Ask family and friends for help when you need it.    Do not place anything in your vagina for 6 weeks.    You are not restricted on  other activities, but take it easy for a few weeks to allow your body to recover from delivery.  You are able to do any activities you feel up to that point.    No driving until you have stopped taking your pain medications (usually two weeks after delivery).     Call your health care provider if you have any of these symptoms:       Increased pain, swelling, redness, or fluid around your stiches from an episiotomy or perineal tear.    A fever above 100.4 F (38 C) with or without chills when placing a thermometer under your tongue.    You soak a sanitary pad with blood within 1 hour, or you see blood clots larger than a golf ball.    Bleeding that lasts more than 6 weeks.    Vaginal discharge that smells bad.    Severe pain, cramping or tenderness in your lower belly area.    A need to urinate more frequently (use the toilet more often), more urgently (use the toilet very quickly), or it burns when you urinate.    Nausea and vomiting.    Redness, swelling or pain around a vein in your leg.    Problems breastfeeding or a red or painful area on your breast.    Chest pain and cough or are gasping for air.    Problems coping with sadness, anxiety, or depression.  If you have any concerns about hurting yourself or the baby, call your provider immediately.     You have questions or concerns after you return home.     Keep your hands clean:  Always wash your hands before touching your perineal area and stitches.  This helps reduce your risk of infection.  If your hands aren't dirty, you may use an alcohol hand-rub to clean your hands. Keep your nails clean and short.        Pending Results     No orders found from 6/5/2018 to 6/8/2018.            Statement of Approval     Ordered          06/09/18 1054  I have reviewed and agree with all the recommendations and orders detailed in this document.  EFFECTIVE NOW     Approved and electronically signed by:  Yodit Nicole CNM             Admission Information     Date &  "Time Provider Department Dept. Phone    6/7/2018 Radha Melo, VENANCIO CNM [address] [phone]      Your Vitals Were     Blood Pressure Pulse Temperature Respirations Height Weight    91/49 77 97.2  F (36.2  C) (Oral) 16 1.676 m (5' 6\") 81.2 kg (179 lb)    Pulse Oximetry BMI (Body Mass Index)                95% 28.89 kg/m2          [email or web address] Information     [employer] gives you secure access to your electronic health record. If you see a primary care provider, you can also send messages to your care team and make appointments. If you have questions, please call your primary care clinic.  If you do not have a primary care provider, please call [phone] and they will assist you.        Care EveryWhere ID     This is your Care EveryWhere ID. This could be used by other organizations to access your [facility] medical records  [id number]        Equal Access to Services     PRIYANKA JULIAN AH: Griselda parkero Somazin, waaxda luqaleah, qaybta kaalmada adejoyce, fam patel. So [facility] [phone].    ATENCIÓN: Si habla español, tiene a mon disposición servicios gratuitos de asistencia lingüística. Llame al [phone].    We comply with applicable federal civil rights laws and [place] laws. We do not discriminate on the basis of race, color, national origin, age, disability, sex, sexual orientation, or gender identity.               Review of your medicines      CONTINUE these medicines which have NOT CHANGED        Dose / Directions    cyanocobalamin 1000 MCG tablet   Commonly known as:  vitamin  B-12        Dose:  1000 mcg   Take 1,000 mcg by mouth every other day   Refills:  0       DHA PO        Refills:  0       IRON (FERROUS GLUCONATE) 325 MG Tabs        1 TABLET DAILY   Refills:  0       prenatal multivitamin plus iron 27-0.8 MG Tabs per tablet        Dose:  1 tablet   Take 1 tablet by mouth daily   Quantity:  100 tablet   Refills:  3          "       Protect others around you: Learn how to safely use, store and throw away your medicines at www.disposemymeds.org.             Medication List: This is a list of all your medications and when to take them. Check marks below indicate your daily home schedule. Keep this list as a reference.      Medications           Morning Afternoon Evening Bedtime As Needed    cyanocobalamin 1000 MCG tablet   Commonly known as:  vitamin  B-12   Take 1,000 mcg by mouth every other day                                DHA PO                                IRON (FERROUS GLUCONATE) 325 MG Tabs   1 TABLET DAILY                                prenatal multivitamin plus iron 27-0.8 MG Tabs per tablet   Take 1 tablet by mouth daily

## 2018-06-07 NOTE — PROGRESS NOTES
"OB ADMIT HISTORY & PHYSICAL    Name: Tamiko Whitman  MRN: [id number]  YOB: 1980  Date of Admission: 2018    SUBJECTIVE    No significant change in general health status based on examination of the patient, review of Nursing Admission Database, and prenatal record.    HPI: Tamiko Whitman  is a 38 year old  @ 41w2d by first trimester US who presented to L&D on 2018 for  Spontaneous onset of contractions starting at 06:30 this AM, intensifying @ 14:00. Has had bloody show.   Reported positive fetal movement. Denied vaginal bleeding, leaking of fluid, severe HA, visual changes, or RUQ pain. Here with partner and daughter for support.    Of note, pt became very hypotensive after epidural in labor with first child, prefers unmedicated labor and birth this time.      Pregnancy complications:  AMA with normal cfDNA    Prenatal labs:  B, antibody screen negative,  GBS negative, Rubella Immune, GCT 95  Genetic Screening: normal MT21    OB history:   Obstetric History       T1      L1     SAB1   TAB0   Ectopic0   Multiple0   Live Births1       # Outcome Date GA Lbr Jakub/2nd Weight Sex Delivery Anes PTL Lv   3 Current            2 Term 16 40w6d 32:22 3.118 kg (6 lb 14 oz) F   N VINAY      Apgar1:  9                Apgar5: 9   1 SAB                 Prenatal Labs:   Lab Results   Component Value Date    ABO B 2017    RH Pos 2017    AS Neg 2017    HEPBANG Neg 2017    TREPAB non reactive 2017    HGB 9.2 (L) 2016       Past Medical History:     Past Medical History:   Diagnosis Date     Syncope     iron def       Past Social History:    Past Surgical History:   Procedure Laterality Date     APPENDECTOMY  age 13     Review of Systems: Positives and negatives in HPI.     OBJECTIVE    Physical Exam:    Vitals:  BP 97/67  Pulse 93  Temp 99.1  F (37.3  C) (Temporal)  Resp 18  Ht 1.676 m (5' 6\")  Wt 81.2 kg (179 lb)  BMI 28.89 kg/m2  General: " Alert Awake in NAD  Abdomen: gravid, EFW 8, cephalic by Leopold's  Cervix:  6-7 cm / 90 % effaced at -1 station, membranes intact; exam done by RN  EFM:  Baseline 150, with moderate variability, 15 bpm x15 sec accelerations present, decelerations absent  Contractions q 5mins lasting 60 secs    ASSESSMENT    IUP at 41w2d admitted for spontaneous onset of labor  Labor Status: latent moving towards active labor  FHR Category: 1  GBS negative, membranes intact  Adequately coping      PLAN    Admit to L&D   Routine Care  Expectant Management  Fetal assessment by IA  Coping: Plan hydrotherapy, position changes, breathing techniques  Anticipate .    ----  Radha Melo CNM

## 2018-06-07 NOTE — IP AVS SNAPSHOT
[address]    [place] [address]    Phone:  [phone]                                       After Visit Summary   6/7/2018    Tamiko Whitman    MRN: [id number]           After Visit Summary Signature Page     I have received my discharge instructions, and my questions have been answered. I have discussed any challenges I see with this plan with the nurse or doctor.    ..........................................................................................................................................  Patient/Patient Representative Signature      ..........................................................................................................................................  Patient Representative Print Name and Relationship to Patient    ..................................................               ................................................  Date                                            Time    ..........................................................................................................................................  Reviewed by Signature/Title    ...................................................              ..............................................  Date                                                            Time

## 2018-06-08 LAB
HGB BLD-MCNC: 10.3 G/DL (ref 11.7–15.7)
T PALLIDUM AB SER QL: NONREACTIVE

## 2018-06-08 PROCEDURE — 12000037 ZZH R&B POSTPARTUM INTERMEDIATE

## 2018-06-08 PROCEDURE — 25000132 ZZH RX MED GY IP 250 OP 250 PS 637: Performed by: ADVANCED PRACTICE MIDWIFE

## 2018-06-08 PROCEDURE — 85018 HEMOGLOBIN: CPT | Performed by: ADVANCED PRACTICE MIDWIFE

## 2018-06-08 PROCEDURE — 36415 COLL VENOUS BLD VENIPUNCTURE: CPT | Performed by: ADVANCED PRACTICE MIDWIFE

## 2018-06-08 RX ADMIN — IBUPROFEN 800 MG: 400 TABLET ORAL at 14:52

## 2018-06-08 RX ADMIN — IBUPROFEN 800 MG: 400 TABLET ORAL at 21:09

## 2018-06-08 RX ADMIN — IBUPROFEN 800 MG: 400 TABLET ORAL at 08:31

## 2018-06-08 RX ADMIN — SENNOSIDES AND DOCUSATE SODIUM 1 TABLET: 8.6; 5 TABLET ORAL at 21:09

## 2018-06-08 RX ADMIN — ACETAMINOPHEN 650 MG: 325 TABLET, FILM COATED ORAL at 14:52

## 2018-06-08 RX ADMIN — SENNOSIDES AND DOCUSATE SODIUM 1 TABLET: 8.6; 5 TABLET ORAL at 08:32

## 2018-06-08 RX ADMIN — ACETAMINOPHEN 650 MG: 325 TABLET, FILM COATED ORAL at 08:31

## 2018-06-08 RX ADMIN — ACETAMINOPHEN 650 MG: 325 TABLET, FILM COATED ORAL at 21:09

## 2018-06-08 NOTE — PROGRESS NOTES
"[facility]   Post-Partum Progress Note        Interval History:   Doing well.  Pain is adequately controlled.  No fevers.  No history of foul-smelling vaginal discharge.  Good appetite.  Denies chest pain, shortness of breath, nausea or vomiting.  Vaginal bleeding is similar to a heavy menstrual flow.  Breastfeeding with some mild difficulty due to sleepy baby.            Medications:   All medications related to the patient's surgery have been reviewed          Physical Exam:   All vitals stable  Blood pressure 102/62, pulse 83, temperature 97.7  F (36.5  C), temperature source Oral, resp. rate 18, height 1.676 m (5' 6\"), weight 81.2 kg (179 lb), SpO2 95 %, unknown if currently breastfeeding.  Exam deferred. Pt nursing baby.        Data:     Hemoglobin   Date Value Ref Range Status   06/08/2018 10.3 (L) 11.7 - 15.7 g/dL Final   06/07/2018 13.7 11.7 - 15.7 g/dL Final   03/29/2016 9.2 (L) 11.7 - 15.7 g/dL Final   03/28/2016 10.0 (L) 11.7 - 15.7 g/dL Final   03/27/2016 14.3 11.7 - 15.7 g/dL Final            Assessment and Plan:    Assessment:   Post-partum day #1  Normal spontaneous vaginal delivery  :     Doing well.  Postpartum hemorrhage. Recovering well. No symptoms this morning.    Plan:   Anticipate discharge tomorrow   Felisa Floyd CNM  "

## 2018-06-08 NOTE — PLAN OF CARE
Problem: Patient Care Overview  Goal: Plan of Care/Patient Progress Review  Outcome: Improving  Patient breathing well through contractions. Tolerating well. Denies needs.

## 2018-06-08 NOTE — PROGRESS NOTES
Initial Lactation visit. Recommend unlimited, frequent breast feedings:  At least 8 - 12 times every 24 hours.  Avoid pacifiers and supplementation with formula unless medically indicated    Explained benefits of holding baby skin on skin to help promote better breastfeeding outcomes. Experienced mom reports breastfeeding is going well. Observed good latch and rhythmic suckle. Will continue to follow as needed. N Day RN IBCLC

## 2018-06-08 NOTE — L&D DELIVERY NOTE
Delivery Summary    Tamiko Whitman MRN# [id number]   Age: 38 year old YOB: 1980     Delivery Summary    Tamiko is a 38 year old, now  at 41w2d gestation who presented to L&D for spontaneous onset of contractions.    Pregnancy Hx: Pregnancy was uncomplicated.  Labs: Rh positive, GBS neg, Rubella immune    Stage 1  Tamiko presented to L&D on 18 for spontaneous onset of contractions, which started at approx 06:30am and intensified at approx 14:00. At the time of admission, her cervical exam was 6-7cm/90%/-2, intact bag of water. Labor progressed along a normal labor curve. She had spontaneous rupture of membranes, likely while laboring in tub, no meconium noted. She coped with position changes, hydrotherapy, and her own inner resources. FHR was Category 1 at admission and then reassuring by intermittent auscultation throughout first stage of labor with increases present, no decreases noted.   Stage 2     Patient spontaneously had urge to push at 20:10, and got out of tub and into hands and knees position on bed. She pushed with excellent maternal effort, FHR remained reassuring by IA. She achieved a normal spontaneous vaginal delivery in hands and knees position on 18 at 20:22 of a viable male infant, weight pending, OA to DESIRE. Shoulders and body delivered without difficulty. Nuchal cord not noted. Infant with spontaneous cry and passed through mother's legs and placed on her abdomen. Apgars 9/9 at one and five minutes. Cord clamped and cut after pulsations ceased. At time of infant delivery, gush of blood and clots noted after body delivered. Immediately slowed, and normal bleeding noted with delivery of placenta.    Stage 3  Spontaneous delivery of complete and intact placenta with 3 vessel cord at 20:35. Cord blood obtained.  IV Pitocin administered after delivery of placenta. Inspection showed bilateral vaginal lacerations, with right vaginal laceration extended down into second  degree perineal laceration. At this time patient reported she felt dizzy and lightheaded. She had significant shaking following delivery and it was difficult to obtain blood pressure. Oxygen applied, head of bed lowered, fluids administered. Heart rate in 90s and low 100s at this time, then lowered into 80s bpm. BP at this time was noted to be 107/43. After fluid administered, BP normalized. Patient had significant discomfort with inspection of lacerations, not improved with local lidocaine. IV fentanyl administered with some relief.  Repaired lacerations with 3-0 vicryl in the usual fashion. Lacerations achieved hemostasis. QBL was 1288mL, with >1,000mL from bleeding immediately following delivery of body. Fundus firm, patient reports she is feeling less dizzy. Hemoglobin lab in process.   Mother and infant left stable, skin to skin, establishing breastfeeding and bonding. Consulted with Dr. De La Garza re: blood loss, agrees with plan to closely monitor for symptoms and hemoglobin changes.    Radha Melo CNM  2018  10:18 PM              Labor Event Times    Labor onset date:  18 Onset time:   5:20 PM   Dilation complete date:  18 Complete time:   8:10 PM   Start pushing date/time:  2018 2010            Labor Events     labor?:  No    steroids:  None   Labor Type:  Spontaneous   Predominate monitoring during 1st stage:  intermittent auscultation      Antibiotics received during labor?:  No      1:1 continuous labor support provided by?:  provider          Delivery/Placenta Date and Time    Delivery Date:  18 Delivery Time:   8:22 PM   Placenta Date/Time:  2018  8:35 PM   Oxytocin given at the time of delivery:  after delivery of placenta      Vaginal Counts    Initial count performed by 2 team members:   Two Team Members   DEEPAK Chong RN          Needles Suture [place] [employer]ong[employer] Instruments   Initial counts 2 0 5    Added to count 0 3 10    Final counts 2 3 15        Placed during labor Accounted for at the end of labor   No NA   No NA   No NA      Final count performed by 2 team members:   Two Team Members   DEEPAK Chong RN         Final count correct?:  Yes         Apgars    Living status:  Living    1 Minute 5 Minute 10 Minute 15 Minute 20 Minute   Skin color: 1  1       Heart rate: 2  2       Reflex irritability: 2  2       Muscle tone: 2  2       Respiratory effort: 2  2       Total: 9  9          Apgars assigned by:  NEISHA PIERRE RN      Cord    Vessels:  3 Vessels Complications:  None   Cord Blood Disposition:  Lab Gases Sent?:  No         [place] Resuscitation    Methods:  None      Output in Delivery Room:  Voided      Skin to Skin and Feeding Plan    Skin to skin initiation date/time: 18   Skin to skin with:  Mother   Skin to skin end date/time:     How do you plan to feed your baby:  Breastfeeding      Labor Events and Shoulder Dystocia    Fetal Tracing Prior to Delivery:  Category 1   Fetal Tracing Comments:  Reassuring by intermittent auscultation   Shoulder dystocia present?:  Neg            Delivery (Maternal) (Provider to Complete) ([id number])    Episiotomy:  None   Perineal lacerations:  2nd    Vaginal laceration?:  Yes Repaired?:  Yes   Cervical laceration?:  No          Mother's Information  Mother: Tamiko Whitman #[id number]    Start of Mother's Information     IO Blood Loss  18 1720 - 18 2218    Mom's I/O Activity            End of Mother's Information  Mother: Tamiko Whitman #[id number]            Delivery - Provider to Complete ([id number])    Delivering clinician:  JOSEPH PORRAS   CNM Care:  Exclusive CNM care in labor   Attempted Delivery Types (Choose all that apply):  Spontaneous Vaginal Delivery   Delivery Type (Choose the 1 that will go to the Birth History):  Vaginal, Spontaneous Delivery                     Other personnel:   Provider Role    Registered Nurse    Registered Nurse             Placenta    Delayed Cord Clamping:  Done   Date/Time:  6/7/2018  8:35 PM   Removal:  Spontaneous   Disposition:  Hospital disposal      Anesthesia    Method:  Local         Presentation and Position    Presentation:  Vertex   Position:  Left Occiput Anterior                    VENANCIO Brown CNM

## 2018-06-08 NOTE — PLAN OF CARE
Data: Tamiko Whitman transferred to 429 via wheelchair at 0015. Baby transferred via parent's arms.  Action: Receiving unit notified of transfer: Yes. Patient and family notified of room change. Report given to MEGHAN Rehman at 0015. Belongings sent to receiving unit. Accompanied by Registered Nurse. Oriented patient to surroundings. Call light within reach. ID bands double-checked with receiving RN.  Response: Patient tolerated transfer and is stable.

## 2018-06-08 NOTE — PLAN OF CARE
Patient assisted to tub. No thermometers available for tub on unit. Okay per CNM. Patient comfortable in the water, and prefers it slightly cooler. RN and CNM at bedside for support. Hydration encouraged. Patient breathing through contractions. Tolerating well. Denies needs at this time. Saline lock in place per CNM order. Encouraged to verbalize needs and ask questions. Intermittent auscultation used at this time. Plan to next RN to assume care at this time. Continue to monitor.

## 2018-06-08 NOTE — PLAN OF CARE
Problem: Patient Care Overview  Goal: Plan of Care/Patient Progress Review  Outcome: No Change  VSS, tolerating regular diet, and voiding. Up ad helen. Pain controlled well with tylenol and ibuprofen. Using aqua K pad, ice and tucks for comfort.  Baby breastfeeding well throughout shift. Parents independent with infant cares. Continue to monitor.

## 2018-06-08 NOTE — PLAN OF CARE
Problem: Patient Care Overview  Goal: Plan of Care/Patient Progress Review  Outcome: No Change  Patient statespain controlled well ice and tucks. Voiding without difficulty. Scant vaginal bleeding, repeat Hgb this AM. On pathway. Will assist with cares as needed.

## 2018-06-08 NOTE — PLAN OF CARE
Problem: Patient Care Overview  Goal: Plan of Care/Patient Progress Review  Outcome: Improving  Doing well,vss,voiding with out difficulty,pain control with tylenol&ibuprofen,aqua k pad given for uterine cramps,baby breast feeding well.

## 2018-06-08 NOTE — PLAN OF CARE
Report received from Mary Squires RN. RN and DEEPAK Melo in room with pt. Pt laboring in tub. Support provided by MILTONM, RN, and . IA of fetal heart tones with contractions. Pt breathing well through contractions with coaching. Pt reports need to push. Pt assisted out of the tub and into bed at . Pt pushing with contractions. Bag of ramon noted to be no longer intact upon exam by CNM. Unsure of SROM time due to pt laboring in the tub.  at  by DEEPAK Melo. Pt on hands and knees at time of delivery.  Pt assisted to semifowlers position after delivery of infant. Drape removed from under pt. Pt unable to tolerate repair even after local administered. Order received for one dose of IV fentanyl. IV fentanyl given to pt. Pt tolerated repair with support from RN and DEEPAK after fentanyl administration. Repair completed at 2205. Fundus firm during repair. QBL calculated and reported to CNM. Pericare done and ice pack applied to perineum. Pt denies any pain medication at this time. VSS. Infant remained skin to skin with mother during entire time of repair.

## 2018-06-09 VITALS
SYSTOLIC BLOOD PRESSURE: 91 MMHG | HEART RATE: 77 BPM | DIASTOLIC BLOOD PRESSURE: 49 MMHG | TEMPERATURE: 97.2 F | BODY MASS INDEX: 28.77 KG/M2 | RESPIRATION RATE: 16 BRPM | WEIGHT: 179 LBS | HEIGHT: 66 IN | OXYGEN SATURATION: 95 %

## 2018-06-09 PROCEDURE — 25000132 ZZH RX MED GY IP 250 OP 250 PS 637: Performed by: ADVANCED PRACTICE MIDWIFE

## 2018-06-09 RX ADMIN — IBUPROFEN 800 MG: 400 TABLET ORAL at 02:53

## 2018-06-09 RX ADMIN — ACETAMINOPHEN 650 MG: 325 TABLET, FILM COATED ORAL at 02:54

## 2018-06-09 NOTE — PLAN OF CARE
Problem: Patient Care Overview  Goal: Plan of Care/Patient Progress Review  Outcome: Adequate for Discharge Date Met: 06/09/18  Vitals stable. Feels well. Oral pain meds working. Breast feeding going well. Ready for discharge home with baby.

## 2018-06-09 NOTE — PLAN OF CARE
Problem: Patient Care Overview  Goal: Plan of Care/Patient Progress Review  Outcome: Improving  Patient's vital signs are stable.  Patient states her blood pressure normally is 80's/50's, which is what patient's blood pressure has been. Patient is up independently and voiding adequate amounts.  Patient is using Tylenol and Ibuprofen for pain.  Breastfeeding on demand and independently.

## 2018-06-09 NOTE — DISCHARGE SUMMARY
"OB Post-partum Note  PPD# 2    S:  Patient doing well.  Pain controlled.  Voiding.  Bleeding scant.  Breast feeding well.  Happy with birth, ready to go home, has good family support.  Denies HA, dizziness or SOB    O:  BP 91/49  Pulse 77  Temp 97.2  F (36.2  C) (Oral)  Resp 16  Ht 1.676 m (5' 6\")  Wt 81.2 kg (179 lb)  SpO2 95%  Breastfeeding  BMI 28.89 kg/m2  Gen- A&O, NAD  Abd- Non-tender, fundus firm at umbilicus  Ext- non-tender, neg edema    Hemoglobin   Date Value Ref Range Status   2018 10.3 (L) 11.7 - 15.7 g/dL Final     B+  Rubella Immune    A/P: 38 year old  PPD# 2 s/p , s/p post partum hemmorhage, breastfeeding    1.  Post partum teaching/warning and precautions reviewed  2.  D/C home today, OTC analgesics, RTC in 6 weeks/prn    Yodit Nicole CNM  2018  10:50 AM  "

## 2018-06-09 NOTE — PROGRESS NOTES
Follow up visit. Mom reports baby has been cluster feeding- no concerns or questions. Referred to Peds lactation if concerns arise post- discharge. N Day RN IBCLC

## 2018-06-28 ENCOUNTER — TELEPHONE (OUTPATIENT)
Dept: FAMILY MEDICINE | Facility: CLINIC | Age: 38
End: 2018-06-28

## 2018-12-19 ENCOUNTER — OFFICE VISIT (OUTPATIENT)
Dept: URGENT CARE | Facility: URGENT CARE | Age: 38
End: 2018-12-19
Payer: COMMERCIAL

## 2018-12-19 VITALS
HEART RATE: 74 BPM | SYSTOLIC BLOOD PRESSURE: 98 MMHG | OXYGEN SATURATION: 98 % | TEMPERATURE: 98.3 F | DIASTOLIC BLOOD PRESSURE: 66 MMHG

## 2018-12-19 DIAGNOSIS — J01.90 ACUTE SINUSITIS WITH COEXISTING CONDITION REQUIRING PROPHYLACTIC TREATMENT: Primary | ICD-10-CM

## 2018-12-19 DIAGNOSIS — H92.03 OTALGIA, BILATERAL: ICD-10-CM

## 2018-12-19 PROCEDURE — 99214 OFFICE O/P EST MOD 30 MIN: CPT | Performed by: FAMILY MEDICINE

## 2018-12-19 RX ORDER — ERGOCALCIFEROL (VITAMIN D2) 10 MCG
1 TABLET ORAL DAILY
COMMUNITY

## 2018-12-19 RX ORDER — FLUTICASONE PROPIONATE 50 MCG
1-2 SPRAY, SUSPENSION (ML) NASAL DAILY
Qty: 1 BOTTLE | Refills: 0 | Status: SHIPPED | OUTPATIENT
Start: 2018-12-19

## 2018-12-19 RX ORDER — AZITHROMYCIN 250 MG/1
TABLET, FILM COATED ORAL
Qty: 6 TABLET | Refills: 0 | Status: SHIPPED | OUTPATIENT
Start: 2018-12-19

## 2018-12-19 NOTE — PATIENT INSTRUCTIONS
Okay to take ibuprofen 200 mg - 4 tablets (800 mg) every 8 hours as needed.  Okay to take tylenol 500 mg - 2 tablets (1000 mg) every 6-8 hours as needed, do not exceed 3000 mg in 24 hours.  Okay to use flonase to help with sinus congestion.  Okay to take zyrtec daily.  Consider sudafed to help with congestion and eustachian tube dysfunction.    If not improving in 3-5 or more pressure in sinuses, then okay to start antibiotic - Zpak.      Patient Education     Understanding Sinus Problems    You don t often think about your sinuses until there s a problem. One day you realize you can t smell dinner cooking. Or you find you often have headaches or problems breathing through your nose.  Symptoms of sinus problems  Sinus problems can cause uncomfortable symptoms. Your nose may run constantly. You might have trouble sleeping at night. You may even lose your sense of smell. Other symptoms can include:    Nasal congestion    Fullness in ears    Green, yellow, or bloody drainage from the nose    Trouble tasting food    Frequent headaches    Facial pain    Cough  When sinuses are blocked  If something blocks the passages in the nose or sinuses, mucus can t drain. Mucus-filled sinuses often become infected.    Colds cause the lining of the nose and sinuses to swell and make extra mucus. A buildup of mucus can lead to a more serious infection.    Allergies irritate turbinates and other tissues. This causes swelling, which can cause a blockage. Over time, this irritation can also lead to sacs of swollen tissue (polyps).    Polyps may form in both the sinuses and nose. Polyps can grow large enough to clog nasal passages and block drainage.    A crooked (deviated) septum may block nasal passages. This is often the result of an injury.  Date Last Reviewed: 11/1/2016 2000-2018 [employer]. [address] [place] [address], [place] [address]. All rights reserved. This information is not intended as a substitute for  professional medical care. Always follow your healthcare professional's instructions.           Patient Education     Earache, No Infection (Adult)  Earaches can happen without an infection. This occurs when air and fluid build up behind the eardrum causing a feeling of fullness and discomfort and reduced hearing. This is called otitis media with effusion (OME) or serous otitis media. It means there is fluid in the middle ear. It is not the same as acute otitis media, which is typically from infection.  OME can happen when you have a cold if congestion blocks the passage that drains the middle ear. This passage is called the eustachian tube. OME may also occur with nasal allergies or after a bacterial middle ear infection.    The pain or discomfort may come and go. You may hear clicking or popping sounds when you chew or swallow. You may feel that your balance is off. Or you may hear ringing in the ear.  It often takes from several weeks up to 3 months for the fluid to clear on its own. Oral pain relievers and ear drops help if there is pain. Decongestants and antihistamines sometimes help. Antibiotics don't help since there is no infection. Your doctor may prescribe a nasal spray to help reduce swelling in the nose and eustachian tube. This can allow the ear to drain.  If your OME doesn't improve after 3 months, surgery may be used to drain the fluid and insert a small tube in the eardrum to allow continued drainage.  Because the middle ear fluid can become infected, it is important to watch for signs of an ear infection which may develop later. These signs include increased ear pain, fever, or drainage from the ear.  Home care  The following guidelines will help you care for yourself at home:    You may use over-the-counter medicine as directed to control pain, unless another medicine was prescribed. If you have chronic liver or kidney disease or ever had a stomach ulcer or GI bleeding, talk with your doctor  before using these medicines. Aspirin should never be used in anyone under 18 years of age who is ill with a fever. It may cause severe liver damage.    You may use over-the-counter decongestants such as phenylephrine or pseudoephedrine. But they are not always helpful. Don't use nasal spray decongestants more than 3 days. Longer use can make congestion worse. Prescription nasal sprays from your doctor don't typically have those restrictions.    Antihistamines may help if you are also having allergy symptoms.    You may use medicines such as guaifenesin to thin mucus and promote drainage.  Follow-up care  Follow up with your healthcare provider or as advised if you are not feeling better after 3 days.  When to seek medical advice  Call your healthcare provider right away if any of the following occur:    Your ear pain gets worse or does not start to improve     Fever of 100.4 F (38 C) or higher, or as directed by your healthcare provider    Fluid or blood draining from the ear    Headache or sinus pain    Stiff neck    Unusual drowsiness or confusion  Date Last Reviewed: 10/1/2016    5887-0581 [employer]. [address] [place] [address], [place] [address]. All rights reserved. This information is not intended as a substitute for professional medical care. Always follow your healthcare professional's instructions.

## 2018-12-19 NOTE — PROGRESS NOTES
SUBJECTIVE:   Tamiko Whitman is a 38 year old female presenting with a chief complaint of sinus pressure, HA, cough, voice hoarseness, bilateral ear pain, nasal congestion.  Onset of symptoms was 1 week(s) ago.  Course of illness is worsening.    Severity moderate  Current and Associated symptoms: sinus pressure, HA, voice hoarseness, ear pressure  Treatment measures tried include Fluids, Rest and Ibuprofen.  Predisposing factors include ill contact: Family member, currently breastfeeding 6 month old baby .    Past Medical History:   Diagnosis Date     Syncope 2011    iron def     Current Outpatient Medications   Medication Sig Dispense Refill     Vitamin D, Cholecalciferol, 400 units TABS Take 1 tablet by mouth daily       cyanocobalamin (VITAMIN  B-12) 1000 MCG tablet Take 1,000 mcg by mouth every other day       Docosahexaenoic Acid (DHA PO)        IRON (FERROUS GLUCONATE) 325 MG OR TABS 1 TABLET DAILY       Prenatal Vit-Fe Fumarate-FA (PRENATAL MULTIVITAMIN  PLUS IRON) 27-0.8 MG TABS per tablet Take 1 tablet by mouth daily 100 tablet 3     Social History     Tobacco Use     Smoking status: Never Smoker     Smokeless tobacco: Never Used   Substance Use Topics     Alcohol use: No     Alcohol/week: 0.0 - 0.6 oz       ROS:  Review of systems negative except as stated above.    OBJECTIVE:  BP 98/66 (BP Location: Right arm, Patient Position: Sitting, Cuff Size: Adult Regular)   Pulse 74   Temp 98.3  F (36.8  C) (Tympanic)   SpO2 98%   GENERAL APPEARANCE: healthy, alert and no distress  EYES: EOMI,  PERRL, conjunctiva clear  HENT: ear canals and TM's normal.  Nose and mouth without ulcers, erythema or lesions.  No sinus tenderness  NECK: supple, nontender, no lymphadenopathy  RESP: lungs clear to auscultation - no rales, rhonchi or wheezes  CV: regular rates and rhythm, normal S1 S2, no murmur noted  Extremities: no peripheral edema or tenderness, peripheral pulses normal  SKIN: no suspicious lesions or  rashes  PSYCH: mentation appears normal and affect normal/bright    ASSESSMENT/PLAN:  (J01.90) Acute sinusitis with coexisting condition requiring prophylactic treatment  (primary encounter diagnosis)  Plan: fluticasone (FLONASE) 50 MCG/ACT nasal spray,         azithromycin (ZITHROMAX) 250 MG tablet            (H92.03) Otalgia, bilateral  Plan: zyrtec, sudafed      Reassurance given, reviewed symptomatic treatment with tylenol, ibuprofen, plenty of fluids and rest.  RX flonase to help with sinus congestion, reviewed that ear pain is most likely due to eustachian tube dysfunction and not due to acute OM, okay to take zyrtec and if not improving, may try sudafed.  Caution given regarding sudafed may caused more side effect with infant and decrease breast milk production.  Reviewed appropriate timing for antibiotic, if symptoms worsened beyond 10 days or more tenderness on sinuses with percussion, then okay to fill and take Zpak.    Return to clinic if no resolution of symptoms in 1 week.    Leo Amador MD  December 19, 2018 11:54 AM

## 2020-02-23 ENCOUNTER — HEALTH MAINTENANCE LETTER (OUTPATIENT)
Age: 40
End: 2020-02-23

## 2020-12-06 ENCOUNTER — HEALTH MAINTENANCE LETTER (OUTPATIENT)
Age: 40
End: 2020-12-06

## 2021-04-11 ENCOUNTER — HEALTH MAINTENANCE LETTER (OUTPATIENT)
Age: 41
End: 2021-04-11

## 2021-09-26 ENCOUNTER — HEALTH MAINTENANCE LETTER (OUTPATIENT)
Age: 41
End: 2021-09-26

## 2022-05-07 ENCOUNTER — HEALTH MAINTENANCE LETTER (OUTPATIENT)
Age: 42
End: 2022-05-07

## 2023-04-23 ENCOUNTER — HEALTH MAINTENANCE LETTER (OUTPATIENT)
Age: 43
End: 2023-04-23

## 2023-06-02 ENCOUNTER — HEALTH MAINTENANCE LETTER (OUTPATIENT)
Age: 43
End: 2023-06-02

## 2024-02-10 ENCOUNTER — HEALTH MAINTENANCE LETTER (OUTPATIENT)
Age: 44
End: 2024-02-10